# Patient Record
Sex: FEMALE | Race: WHITE | NOT HISPANIC OR LATINO | URBAN - METROPOLITAN AREA
[De-identification: names, ages, dates, MRNs, and addresses within clinical notes are randomized per-mention and may not be internally consistent; named-entity substitution may affect disease eponyms.]

---

## 2023-02-17 ENCOUNTER — TELEPHONE ENCOUNTER (OUTPATIENT)
Dept: URBAN - METROPOLITAN AREA CLINIC 98 | Facility: CLINIC | Age: 38
End: 2023-02-17

## 2023-02-27 ENCOUNTER — OFFICE VISIT (OUTPATIENT)
Dept: URBAN - METROPOLITAN AREA CLINIC 98 | Facility: CLINIC | Age: 38
End: 2023-02-27

## 2023-03-29 ENCOUNTER — OFFICE VISIT (OUTPATIENT)
Dept: URBAN - METROPOLITAN AREA CLINIC 98 | Facility: CLINIC | Age: 38
End: 2023-03-29
Payer: COMMERCIAL

## 2023-03-29 ENCOUNTER — TELEPHONE ENCOUNTER (OUTPATIENT)
Dept: URBAN - METROPOLITAN AREA CLINIC 98 | Facility: CLINIC | Age: 38
End: 2023-03-29

## 2023-03-29 ENCOUNTER — LAB OUTSIDE AN ENCOUNTER (OUTPATIENT)
Dept: URBAN - METROPOLITAN AREA CLINIC 98 | Facility: CLINIC | Age: 38
End: 2023-03-29

## 2023-03-29 ENCOUNTER — DASHBOARD ENCOUNTERS (OUTPATIENT)
Age: 38
End: 2023-03-29

## 2023-03-29 ENCOUNTER — WEB ENCOUNTER (OUTPATIENT)
Dept: URBAN - METROPOLITAN AREA CLINIC 98 | Facility: CLINIC | Age: 38
End: 2023-03-29

## 2023-03-29 VITALS
SYSTOLIC BLOOD PRESSURE: 121 MMHG | BODY MASS INDEX: 20.21 KG/M2 | HEART RATE: 86 BPM | DIASTOLIC BLOOD PRESSURE: 69 MMHG | HEIGHT: 67 IN | WEIGHT: 128.8 LBS | TEMPERATURE: 97.3 F

## 2023-03-29 DIAGNOSIS — N80.9 ENDOMETRIOSIS: ICD-10-CM

## 2023-03-29 DIAGNOSIS — R53.83 FATIGUE, UNSPECIFIED TYPE: ICD-10-CM

## 2023-03-29 DIAGNOSIS — R19.7 DIARRHEA, UNSPECIFIED TYPE: ICD-10-CM

## 2023-03-29 DIAGNOSIS — R10.31 RIGHT LOWER QUADRANT ABDOMINAL PAIN: ICD-10-CM

## 2023-03-29 PROBLEM — 129103003: Status: ACTIVE | Noted: 2023-03-29

## 2023-03-29 PROCEDURE — 99205 OFFICE O/P NEW HI 60 MIN: CPT | Performed by: INTERNAL MEDICINE

## 2023-03-29 RX ORDER — LAMOTRIGINE 100 MG/1
1 TABLET TABLET ORAL ONCE A DAY
Status: ACTIVE | COMMUNITY

## 2023-03-29 RX ORDER — DIAZEPAM 10 MG/2ML
AS DIRECTED GEL RECTAL
Status: ACTIVE | COMMUNITY

## 2023-03-29 RX ORDER — VALACYCLOVIR 1 G/1
1 TABLET TABLET, FILM COATED ORAL ONCE A DAY
Status: ACTIVE | COMMUNITY

## 2023-03-29 RX ORDER — DICYCLOMINE HYDROCHLORIDE 20 MG/1
1 TABLET TABLET ORAL THREE TIMES A DAY
Qty: 90 TABLETS | Refills: 5 | OUTPATIENT
Start: 2023-03-29 | End: 2023-09-25

## 2023-03-29 NOTE — PHYSICAL EXAM RECTAL:
normal tone, no external hemorrhoids, no masses palpable, no red blood, Tenderness on CARA, Internal hemorrhoids present

## 2023-03-29 NOTE — HPI-TODAY'S VISIT:
Dianna/Ying is a 38 yo female who just drove 5 hours from [place]. She is self referred to see her regarding presence of absence of Crohn's disease.  Syx of abdominal pain and diarrhea, detailed below; it is the diarrhea that she feels is most disabling and it seems to wear her out.  26.2 mile marathon like fatigue.  Her mother lives in [place] and a friend sees her.  Crohn's disease dx suspected and not dx. Seen docs [facility]/  Syx started in 2012.  Took Accutane in 2012. DION and one left ovary was removed 8/16/22 in [place].  On Lupron to shut down her one final ovary, to differentiate endometriosis vs CD. She is still Has a history of stage 4 endometriosis. Had a mirena for 5 years.  Saw Tisha Christie at [facility] who discharged her from practice in 2013 and said there was nothing that she could do.  She was a perfect child.  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  Kidney stones in 2014, in [place], in grad school. Partner had passed away with brain cancer. 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  She has considered surgery at [place] for SMA syndrome. Would need to see a different GI for SMA considerations.  Has diarhea **No blood in the stool. **6-15 stools a day.  Appetite is terrible Losing weight. Loses and gains 10 lb. Low iron and low vit D ----????anemia  Was bit by a tick in June of 2012. [facility] has told her she does not have Lyme.  Syx: 5-15 stools a day, mostly in the am. Stooling lasts until bedtime and then goes to sleep. **Right lower quadrant abdominal pain, just before stooling. Morning time, unexpected, urgency. Last colonoscopy was 2013, by docs at [place] or [facility]. Needs a Release of Information. Never had a colonoscopic abnormality. Had 2 normal pillcams. Passes it in 45 minutes.  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  Dr. Calderon ([place]) and denied her questions denied SMA surgery. Dr. Siddiqui saw her for a second opinion. Dr. Monroe is the ER doctor, at [facility] in [place]

## 2023-03-30 LAB
A/G RATIO: 2.6
ALBUMIN: 5.5
ALKALINE PHOSPHATASE: 49
ALT (SGPT): 11
AMYLASE: 72
APPEARANCE: CLEAR
AST (SGOT): 19
BACTERIA: (no result)
BASO (ABSOLUTE): 0.1
BASOS: 2
BILIRUBIN, TOTAL: 0.3
BILIRUBIN: NEGATIVE
BUN/CREATININE RATIO: 15
BUN: 16
C-REACTIVE PROTEIN, QUANT: 3
CALCIUM: 10.2
CARBON DIOXIDE, TOTAL: 27
CAST TYPE: (no result)
CASTS: (no result)
CHLORIDE: 99
COMMENT: (no result)
CREATININE: 1.07
CRYSTAL TYPE: (no result)
CRYSTALS: (no result)
EGFR: 69
EOS (ABSOLUTE): 0.1
EOS: 2
EPITHELIAL CELLS (NON RENAL): (no result)
EPITHELIAL CELLS (RENAL): (no result)
FERRITIN, SERUM: 44
FOLATE (FOLIC ACID), SERUM: 4.8
GLOBULIN, TOTAL: 2.1
GLUCOSE: 97
GLUCOSE: NEGATIVE
HEMATOCRIT: 46.1
HEMATOLOGY COMMENTS:: (no result)
HEMOGLOBIN: 15.3
IMMATURE CELLS: (no result)
IMMATURE GRANS (ABS): 0
IMMATURE GRANULOCYTES: 0
IRON BIND.CAP.(TIBC): 356
IRON SATURATION: 12
IRON: 44
KETONES: NEGATIVE
LIPASE: 27
LYMPHS (ABSOLUTE): 2.3
LYMPHS: 37
MCH: 30.3
MCHC: 33.2
MCV: 91
MICROSCOPIC EXAMINATION: (no result)
MICROSCOPIC EXAMINATION: (no result)
MONOCYTES(ABSOLUTE): 0.3
MONOCYTES: 5
MUCUS THREADS: (no result)
NEUTROPHILS (ABSOLUTE): 3.4
NEUTROPHILS: 54
NITRITE, URINE: NEGATIVE
NRBC: (no result)
OCCULT BLOOD: (no result)
PH: 7
PLATELETS: 187
POTASSIUM: 4.3
PROTEIN, TOTAL: 7.6
PROTEIN: NEGATIVE
RBC: (no result)
RBC: 5.05
RDW: 11.9
SEDIMENTATION RATE-WESTERGREN: 2
SODIUM: 141
SPECIFIC GRAVITY: 1.02
TRICHOMONAS: (no result)
UIBC: 312
URINE-COLOR: YELLOW
UROBILINOGEN,SEMI-QN: 0.2
VITAMIN B12: 640
VITAMIN D, 25-HYDROXY: 41.4
WBC ESTERASE: NEGATIVE
WBC: (no result)
WBC: 6.3
YEAST: (no result)

## 2023-05-03 ENCOUNTER — LAB OUTSIDE AN ENCOUNTER (OUTPATIENT)
Dept: URBAN - METROPOLITAN AREA CLINIC 98 | Facility: CLINIC | Age: 38
End: 2023-05-03

## 2023-05-30 LAB
C DIFFICILE TOXIN GENE NAA: NEGATIVE
C DIFFICILE TOXINS A+B, EIA: NEGATIVE
CALPROTECTIN, FECAL: 20
CAMPYLOBACTER CULTURE: (no result)
E COLI SHIGA TOXIN EIA: NEGATIVE
LACTOFERRIN, FECAL, QUANT.: <1
Lab: (no result)
OVA + PARASITE EXAM: (no result)
SALMONELLA/SHIGELLA SCREEN: (no result)
STOOL CULTURE, YERSINIA ONLY: (no result)